# Patient Record
Sex: FEMALE | Race: BLACK OR AFRICAN AMERICAN
[De-identification: names, ages, dates, MRNs, and addresses within clinical notes are randomized per-mention and may not be internally consistent; named-entity substitution may affect disease eponyms.]

---

## 2018-02-23 ENCOUNTER — HOSPITAL ENCOUNTER (EMERGENCY)
Dept: HOSPITAL 63 - ER | Age: 58
Discharge: HOME | End: 2018-02-23
Payer: MEDICARE

## 2018-02-23 VITALS — HEIGHT: 67 IN | WEIGHT: 197 LBS | BODY MASS INDEX: 30.92 KG/M2

## 2018-02-23 VITALS
DIASTOLIC BLOOD PRESSURE: 67 MMHG | SYSTOLIC BLOOD PRESSURE: 140 MMHG | SYSTOLIC BLOOD PRESSURE: 140 MMHG | DIASTOLIC BLOOD PRESSURE: 67 MMHG

## 2018-02-23 DIAGNOSIS — J45.909: ICD-10-CM
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
## 2018-02-23 DIAGNOSIS — I25.10: ICD-10-CM
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
## 2018-02-23 DIAGNOSIS — S59.902A: Primary | ICD-10-CM

## 2018-02-23 DIAGNOSIS — Z88.1: ICD-10-CM
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
## 2018-02-23 DIAGNOSIS — Z88.2: ICD-10-CM
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
## 2018-02-23 DIAGNOSIS — E11.9: ICD-10-CM

## 2018-02-23 DIAGNOSIS — S39.012A: ICD-10-CM

## 2018-02-23 DIAGNOSIS — I10: ICD-10-CM

## 2018-02-23 DIAGNOSIS — Z88.8: ICD-10-CM

## 2018-02-23 DIAGNOSIS — S29.012A: ICD-10-CM

## 2018-02-23 PROCEDURE — 72072 X-RAY EXAM THORAC SPINE 3VWS: CPT

## 2018-02-23 PROCEDURE — 99284 EMERGENCY DEPT VISIT MOD MDM: CPT

## 2018-02-23 PROCEDURE — 73080 X-RAY EXAM OF ELBOW: CPT

## 2018-02-23 PROCEDURE — 72100 X-RAY EXAM L-S SPINE 2/3 VWS: CPT

## 2018-02-23 NOTE — RAD
Portable left elbow, 3 views, 2/23/2018:



History: Fall, left elbow pain



No fracture or dislocation is identified.  There is no radiographic evidence

of an elbow joint effusion.



IMPRESSION: No significant left elbow abnormality is detected..





Thoracic spine, 3 views, 2/23/2018:



History: Fall, back pain



The thoracic vertebral heights are well-maintained.  There is mild scattered

marginal spurs.  There is a slight left convexity upper thoracic scoliosis. 

No fracture or dislocation is identified.  A surgical plate and screws is

evident in the lower cervical spine.



IMPRESSION:

1.  Mild degenerative change.

2.  No acute bony abnormality is detected.







Portable lumbar spine, 3 views, 2/23/2018:



History: Fall, back pain



The lumbar vertebral heights and disc spaces are well-maintained.  There are

mild scattered marginal spurs.  There are moderate degenerative changes

involving scattered facet joints bilaterally.  No fracture is identified. 

There is a slight anterolisthesis at L4-5 apparently due to facet joint

arthropathy.  Minimal aortic calcific plaquing is noted.



IMPRESSION:

1.  Mild to moderate scattered degenerative changes.

2.  Slight anterolisthesis at L4-5 due to facet joint arthropathy.

3.  No acute bony abnormality is detected.

## 2018-02-23 NOTE — PHYS DOC
Past History


Past Medical History:  Asthma, CAD, Diabetes, Hypertension


Smoking:  Non-smoker





Adult General


Chief Complaint


Chief Complaint:  MECHANICAL FALL





HPI


HPI


57 year old female patient states she was getting out of her car and fell on 

ice on the driveway of her home and landed on her left site without head injury 

or loss of consciousness. Patient states she was not able to get up and one of 

the neighbor called 911 and they helped her to get up and she was able to walk 

but complaining of pain in left elbow and thoracic and lumbar spine and rated 

her pain 7/10 in elbow and 8/10 in her back a constant pain that getting force 

with movement. Patient denies focal neuro deficit, nausea and vomiting, 

headache. Patient is up-to-date with tetanus immunization.





Review of Systems


Review of Systems





Constitutional: Denies fever or chills []


Eyes: Denies change in visual acuity, redness, or eye pain []


HENT: Denies nasal congestion or sore throat []


Respiratory: Denies cough or shortness of breath []


Cardiovascular: No additional information not addressed in HPI []


GI: Denies abdominal pain, nausea, vomiting, bloody stools or diarrhea []


: Denies dysuria or hematuria []


Musculoskeletal: Reports back pain and joint pain


Integument: Denies rash or skin lesions []


Neurologic: Denies headache, focal weakness or sensory changes []


Endocrine: Denies polyuria or polydipsia []





All other systems were reviewed and found to be within normal limits, except as 

documented in this note.





Current Medications


Current Medications





Current Medications








 Medications


  (Trade)  Dose


 Ordered  Sig/Bryan  Start Time


 Stop Time Status Last Admin


Dose Admin


 


 Acetaminophen/


 Hydrocodone Bitart


  (Lortab 5/325)  1 tab  1X  ONCE  18 12:15


 18 12:16 UNV  


 











Allergies


Allergies





Allergies








Coded Allergies Type Severity Reaction Last Updated Verified


 


  Sulfa (Sulfonamide Antibiotics) Allergy Unknown  18 Yes


 


  nitrofurantoin Allergy Unknown  18 Yes


 


  sulfamethoxazole Allergy Unknown  18 Yes


 


  trimethoprim Allergy Unknown  18 Yes











Physical Exam


Physical Exam





Constitutional: Well developed, well nourished, mild distress, non-toxic 

appearance. []


HENT: Normocephalic, atraumatic, bilateral external ears normal, oropharynx 

moist, no oral exudates, nose normal. []


Eyes: PERRLA, EOMI, conjunctiva normal, no discharge. [] 


Neck: Normal range of motion, no tenderness, supple, no stridor. [] 


Cardiovascular:Heart rate regular rhythm, no murmur []


Lungs & Thorax:  Bilateral breath sounds clear to auscultation []


Abdomen: Bowel sounds normal, soft, no tenderness, no masses, no pulsatile 

masses. [] 


Skin: Warm, dry, no erythema, no rash. [] 


Back: No deformity or midline tenderness, limited range of motion of thoracic 

and lumbar spine because of pain


Extremities: No deformity of left elbow and forearm, mild tenderness in 

proximal posterior side of left forearm, no neurovascular deficit


Neurologic: Alert and oriented X 3, normal motor function, normal sensory 

function, no focal deficits noted. []


Psychologic: Affect normal, judgement normal, mood normal. []





EKG


EKG


[]





Radiology/Procedures


Radiology/Procedures


[] [facility] [address], [place] [address]


 [phone]


 


 IMAGING REPORT





 Signed





PATIENT: LUCA RENNER ACCOUNT: [id number] MRN#: [id number]


: 1960 LOCATION: ER AGE: 57


SEX: F EXAM DT: 18 ACCESSION#: [id number]; [id number]; [id number]


STATUS: REG ER ORD. PHYSICIAN: SONJA MATHIAS MD 


REASON: fall


PROCEDURE: ELBOW LEFT 3V; LUMBAR SPINE 2-3V; THORACIC SPINE 3V





Portable left elbow, 3 views, 2018:





History: Fall, left elbow pain





No fracture or dislocation is identified.  There is no radiographic evidence


of an elbow joint effusion.





IMPRESSION: No significant left elbow abnormality is detected..








Thoracic spine, 3 views, 2018:





History: Fall, back pain





The thoracic vertebral heights are well-maintained.  There is mild scattered


marginal spurs.  There is a slight left convexity upper thoracic scoliosis. 


No fracture or dislocation is identified.  A surgical plate and screws is


evident in the lower cervical spine.





IMPRESSION:


1.  Mild degenerative change.


2.  No acute bony abnormality is detected.











Portable lumbar spine, 3 views, 2018:





History: Fall, back pain





The lumbar vertebral heights and disc spaces are well-maintained.  There are


mild scattered marginal spurs.  There are moderate degenerative changes


involving scattered facet joints bilaterally.  No fracture is identified. 


There is a slight anterolisthesis at L4-5 apparently due to facet joint


arthropathy.  Minimal aortic calcific plaquing is noted.





IMPRESSION:


1.  Mild to moderate scattered degenerative changes.


2.  Slight anterolisthesis at L4-5 due to facet joint arthropathy.


3.  No acute bony abnormality is detected.





Course & Med Decision Making


Course & Med Decision Making


Pertinent Imaging studies reviewed. (See chart for details)


Evaluation of patient in ER showed 57-year-old female patient with a fall and 

injury to left elbow and back. Patient had unremarkable physical exam except 

for mild limited range of motion because of pain. X-ray of left elbow and 

thoracic and lumbar spine was unremarkable. Patient treated with Norco and felt 

better. Plan discharge patient home with diagnosis of mechanical fall and 

injury to elbow. Patient completed without problem.





I've spoken with the patient and/or caregivers. I've explained the patient's 

condition, diagnosis and treatment plan based on information available to me at 

this time. I've answered the patient's and/or caregivers questions and 

addressed any concerns. The patient and/or caregivers have a good understanding 

the patient's diagnosis, condition and treatment plan as can be expected at 

this point. Vital signs have been stabilized. The patient's condition is stable 

for discharge from the emergency department.





The patient will pursue further outpatient evaluation with her primary care 

provider or other designated consulting physician as outlined in the discharge 

instructions. Patient and/or caregivers are agreeable to this plan of care and 

follow-up instructions have been explained in detail. The patient and/or 

caregivers have received these instructions in written format and expressed 

understanding of these discharge instructions. The patient and her caregivers 

are aware that if any significant change in condition or worsening of symptoms 

should prompt him to immediately return to this of the closest emergency 

department.  If an emergent department is not readily available I would 

encourage him to call 911.


[]





Dragon Disclaimer


Dragon Disclaimer


This electronic medical record was generated, in whole or in part, using a 

voice recognition dictation system.





Departure


Departure:


Impression:  


 Primary Impression:  


 Injury of left elbow


 Additional Impressions:  


 Thoracic myofascial strain


 Lumbar strain


 Fall from slipping on ice


Disposition:   HOME, SELF-CARE (At 1308)


Condition:  IMPROVED


Referrals:  


KRISTINA MARTE MD (PCP)


Patient Instructions:  Contusion, Lumbosacral Strain, Thoracic Strain





Additional Instructions:  


Apply ice on the affected area


Follow-up with your primary care physician in 3-5 days


Return to ER if not getting better


Scripts


Hydrocodone Bit/Acetaminophen (NORCO 5-325 TABLET) 1 Each Tablet


1 TAB PO PRN Q6HRS Y for PAIN, #14 TAB 0 Refills


   Prov: SONJA MATHIAS MD         18





Problem Qualifiers











SONJA MATHIAS MD 2018 12:10

## 2018-03-08 ENCOUNTER — HOSPITAL ENCOUNTER (OUTPATIENT)
Dept: HOSPITAL 63 - SURG | Age: 58
Discharge: HOME | End: 2018-03-08
Attending: ANESTHESIOLOGY
Payer: MEDICARE

## 2018-03-08 DIAGNOSIS — G47.33: ICD-10-CM

## 2018-03-08 DIAGNOSIS — E11.9: ICD-10-CM

## 2018-03-08 DIAGNOSIS — Z79.4: ICD-10-CM

## 2018-03-08 DIAGNOSIS — J45.909: ICD-10-CM

## 2018-03-08 DIAGNOSIS — Z98.890: ICD-10-CM

## 2018-03-08 DIAGNOSIS — I10: ICD-10-CM

## 2018-03-08 DIAGNOSIS — Z79.899: ICD-10-CM

## 2018-03-08 DIAGNOSIS — Z88.8: ICD-10-CM

## 2018-03-08 DIAGNOSIS — M47.816: Primary | ICD-10-CM

## 2018-03-08 DIAGNOSIS — Z88.2: ICD-10-CM

## 2018-03-08 PROCEDURE — 82947 ASSAY GLUCOSE BLOOD QUANT: CPT

## 2018-03-08 PROCEDURE — 99152 MOD SED SAME PHYS/QHP 5/>YRS: CPT

## 2018-03-08 PROCEDURE — 64493 INJ PARAVERT F JNT L/S 1 LEV: CPT

## 2018-03-08 PROCEDURE — 64494 INJ PARAVERT F JNT L/S 2 LEV: CPT

## 2018-04-05 ENCOUNTER — HOSPITAL ENCOUNTER (INPATIENT)
Dept: HOSPITAL 63 - ER | Age: 58
LOS: 3 days | Discharge: ANOTHER HOSPITAL | DRG: 546 | End: 2018-04-08
Attending: INTERNAL MEDICINE | Admitting: INTERNAL MEDICINE
Payer: MEDICARE

## 2018-04-05 VITALS — DIASTOLIC BLOOD PRESSURE: 81 MMHG | SYSTOLIC BLOOD PRESSURE: 138 MMHG

## 2018-04-05 VITALS — BODY MASS INDEX: 32.18 KG/M2 | HEIGHT: 67 IN | WEIGHT: 205 LBS

## 2018-04-05 VITALS — DIASTOLIC BLOOD PRESSURE: 82 MMHG | SYSTOLIC BLOOD PRESSURE: 141 MMHG

## 2018-04-05 VITALS — DIASTOLIC BLOOD PRESSURE: 76 MMHG | SYSTOLIC BLOOD PRESSURE: 156 MMHG

## 2018-04-05 VITALS — DIASTOLIC BLOOD PRESSURE: 75 MMHG | SYSTOLIC BLOOD PRESSURE: 142 MMHG

## 2018-04-05 DIAGNOSIS — I25.10: ICD-10-CM

## 2018-04-05 DIAGNOSIS — M31.0: Primary | ICD-10-CM

## 2018-04-05 DIAGNOSIS — Z88.8: ICD-10-CM

## 2018-04-05 DIAGNOSIS — N17.9: ICD-10-CM

## 2018-04-05 DIAGNOSIS — Z88.2: ICD-10-CM

## 2018-04-05 DIAGNOSIS — E78.5: ICD-10-CM

## 2018-04-05 DIAGNOSIS — Z82.49: ICD-10-CM

## 2018-04-05 DIAGNOSIS — J45.901: ICD-10-CM

## 2018-04-05 DIAGNOSIS — Z82.3: ICD-10-CM

## 2018-04-05 DIAGNOSIS — Z88.1: ICD-10-CM

## 2018-04-05 DIAGNOSIS — I10: ICD-10-CM

## 2018-04-05 DIAGNOSIS — Z98.1: ICD-10-CM

## 2018-04-05 DIAGNOSIS — Z83.3: ICD-10-CM

## 2018-04-05 DIAGNOSIS — M10.9: ICD-10-CM

## 2018-04-05 DIAGNOSIS — K22.5: ICD-10-CM

## 2018-04-05 DIAGNOSIS — E11.65: ICD-10-CM

## 2018-04-05 LAB
ALBUMIN SERPL-MCNC: 3.1 G/DL (ref 3.4–5)
ALBUMIN/GLOB SERPL: 0.8 {RATIO} (ref 1–1.7)
ALP SERPL-CCNC: 108 U/L (ref 46–116)
ALT SERPL-CCNC: 34 U/L (ref 14–59)
ANION GAP SERPL CALC-SCNC: 11 MMOL/L (ref 6–14)
AST SERPL-CCNC: 22 U/L (ref 15–37)
BASOPHILS # BLD AUTO: 0 X10^3/UL (ref 0–0.2)
BASOPHILS NFR BLD: 1 % (ref 0–3)
BILIRUB SERPL-MCNC: 0.2 MG/DL (ref 0.2–1)
BUN/CREAT SERPL: 19 (ref 6–20)
CA-I SERPL ISE-MCNC: 21 MG/DL (ref 7–20)
CALCIUM SERPL-MCNC: 9.3 MG/DL (ref 8.5–10.1)
CHLORIDE SERPL-SCNC: 105 MMOL/L (ref 98–107)
CO2 SERPL-SCNC: 28 MMOL/L (ref 21–32)
CREAT SERPL-MCNC: 1.1 MG/DL (ref 0.6–1)
EOSINOPHIL NFR BLD: 0.4 X10^3/UL (ref 0–0.7)
EOSINOPHIL NFR BLD: 6 % (ref 0–3)
ERYTHROCYTE [DISTWIDTH] IN BLOOD BY AUTOMATED COUNT: 15.1 % (ref 11.5–14.5)
GFR SERPLBLD BASED ON 1.73 SQ M-ARVRAT: 62.2 ML/MIN
GLOBULIN SER-MCNC: 4 G/DL (ref 2.2–3.8)
GLUCOSE SERPL-MCNC: 201 MG/DL (ref 70–99)
HCT VFR BLD CALC: 37.5 % (ref 36–47)
HGB BLD-MCNC: 12.3 G/DL (ref 12–15.5)
LYMPHOCYTES # BLD: 2.1 X10^3/UL (ref 1–4.8)
LYMPHOCYTES NFR BLD AUTO: 36 % (ref 24–48)
MAGNESIUM SERPL-MCNC: 1.7 MG/DL (ref 1.8–2.4)
MCH RBC QN AUTO: 31 PG (ref 25–35)
MCHC RBC AUTO-ENTMCNC: 33 G/DL (ref 31–37)
MCV RBC AUTO: 94 FL (ref 79–100)
MONO #: 0.7 X10^3/UL (ref 0–1.1)
MONOCYTES NFR BLD: 11 % (ref 0–9)
NEUT #: 2.7 X10^3UL (ref 1.8–7.7)
NEUTROPHILS NFR BLD AUTO: 46 % (ref 31–73)
PLATELET # BLD AUTO: 239 X10^3/UL (ref 140–400)
POTASSIUM SERPL-SCNC: 3.8 MMOL/L (ref 3.5–5.1)
PROT SERPL-MCNC: 7.1 G/DL (ref 6.4–8.2)
RBC # BLD AUTO: 4.01 X10^6/UL (ref 3.5–5.4)
SODIUM SERPL-SCNC: 144 MMOL/L (ref 136–145)
WBC # BLD AUTO: 5.8 X10^3/UL (ref 4–11)

## 2018-04-05 PROCEDURE — 85007 BL SMEAR W/DIFF WBC COUNT: CPT

## 2018-04-05 PROCEDURE — 93005 ELECTROCARDIOGRAM TRACING: CPT

## 2018-04-05 PROCEDURE — 96360 HYDRATION IV INFUSION INIT: CPT

## 2018-04-05 PROCEDURE — 96374 THER/PROPH/DIAG INJ IV PUSH: CPT

## 2018-04-05 PROCEDURE — 36415 COLL VENOUS BLD VENIPUNCTURE: CPT

## 2018-04-05 PROCEDURE — 96361 HYDRATE IV INFUSION ADD-ON: CPT

## 2018-04-05 PROCEDURE — 80053 COMPREHEN METABOLIC PANEL: CPT

## 2018-04-05 PROCEDURE — 80048 BASIC METABOLIC PNL TOTAL CA: CPT

## 2018-04-05 PROCEDURE — 82947 ASSAY GLUCOSE BLOOD QUANT: CPT

## 2018-04-05 PROCEDURE — 85025 COMPLETE CBC W/AUTO DIFF WBC: CPT

## 2018-04-05 PROCEDURE — 83735 ASSAY OF MAGNESIUM: CPT

## 2018-04-05 PROCEDURE — 71045 X-RAY EXAM CHEST 1 VIEW: CPT

## 2018-04-05 PROCEDURE — 82553 CREATINE MB FRACTION: CPT

## 2018-04-05 PROCEDURE — 94640 AIRWAY INHALATION TREATMENT: CPT

## 2018-04-05 PROCEDURE — 84484 ASSAY OF TROPONIN QUANT: CPT

## 2018-04-05 PROCEDURE — 71046 X-RAY EXAM CHEST 2 VIEWS: CPT

## 2018-04-05 RX ADMIN — ALBUTEROL SULFATE SCH MG: 108 AEROSOL, METERED RESPIRATORY (INHALATION) at 18:00

## 2018-04-05 RX ADMIN — METHYLPREDNISOLONE SODIUM SUCCINATE SCH MG: 125 INJECTION, POWDER, FOR SOLUTION INTRAMUSCULAR; INTRAVENOUS at 13:05

## 2018-04-05 RX ADMIN — ALLOPURINOL SCH MG: 300 TABLET ORAL at 13:10

## 2018-04-05 RX ADMIN — INSULIN ASPART SCH UNITS: 100 INJECTION, SOLUTION INTRAVENOUS; SUBCUTANEOUS at 17:00

## 2018-04-05 RX ADMIN — IPRATROPIUM BROMIDE AND ALBUTEROL SULFATE SCH ML: .5; 3 SOLUTION RESPIRATORY (INHALATION) at 20:51

## 2018-04-05 RX ADMIN — IPRATROPIUM BROMIDE AND ALBUTEROL SULFATE SCH ML: .5; 3 SOLUTION RESPIRATORY (INHALATION) at 11:44

## 2018-04-05 RX ADMIN — LOSARTAN POTASSIUM SCH MG: 50 TABLET, FILM COATED ORAL at 13:07

## 2018-04-05 RX ADMIN — METHYLPREDNISOLONE SODIUM SUCCINATE SCH MG: 125 INJECTION, POWDER, FOR SOLUTION INTRAMUSCULAR; INTRAVENOUS at 18:04

## 2018-04-05 RX ADMIN — GABAPENTIN SCH MG: 300 CAPSULE ORAL at 13:09

## 2018-04-05 RX ADMIN — ATORVASTATIN CALCIUM SCH MG: 20 TABLET, FILM COATED ORAL at 13:00

## 2018-04-05 RX ADMIN — IPRATROPIUM BROMIDE AND ALBUTEROL SULFATE SCH ML: .5; 3 SOLUTION RESPIRATORY (INHALATION) at 16:00

## 2018-04-05 RX ADMIN — METHYLPREDNISOLONE SODIUM SUCCINATE SCH MG: 125 INJECTION, POWDER, FOR SOLUTION INTRAMUSCULAR; INTRAVENOUS at 23:35

## 2018-04-05 RX ADMIN — MONTELUKAST SODIUM SCH MG: 10 TABLET ORAL at 21:08

## 2018-04-05 RX ADMIN — ALBUTEROL SULFATE SCH MG: 108 AEROSOL, METERED RESPIRATORY (INHALATION) at 16:30

## 2018-04-05 RX ADMIN — INSULIN ASPART SCH UNITS: 100 INJECTION, SOLUTION INTRAVENOUS; SUBCUTANEOUS at 17:55

## 2018-04-05 RX ADMIN — GABAPENTIN SCH MG: 300 CAPSULE ORAL at 21:09

## 2018-04-05 RX ADMIN — INSULIN ASPART SCH UNITS: 100 INJECTION, SOLUTION INTRAVENOUS; SUBCUTANEOUS at 16:30

## 2018-04-05 RX ADMIN — ASPIRIN 81 MG SCH MG: 81 TABLET ORAL at 13:06

## 2018-04-05 RX ADMIN — TRIAMTERENE AND HYDROCHLOROTHIAZIDE SCH TAB: 37.5; 25 TABLET ORAL at 13:17

## 2018-04-05 RX ADMIN — INSULIN DETEMIR SCH UNITS: 100 INJECTION, SOLUTION SUBCUTANEOUS at 21:17

## 2018-04-05 RX ADMIN — BUDESONIDE SCH MG: 0.5 SUSPENSION RESPIRATORY (INHALATION) at 20:51

## 2018-04-05 NOTE — PHYS DOC
Past History


Past Medical History:  Asthma, CAD, Diabetes, Hypertension


Past Surgical History:  Other


Smoking:  Non-smoker


Alcohol Use:  None


Drug Use:  None





Adult General


Chief Complaint


Chief Complaint:  SHORTNESS OF BREATH





HPI


HPI





Patient is a 57 year old female who presents with complaint shortness of 

breath. The patient states her symptoms started approximately 4-5 days ago. The 

patient states that she has been having coughing and wheezing. The patient went 

to see her primary care provider and was treated with amoxicillin, Tessalon 

Perles, and cough medication. Patient states that she does have history of 

chronic asthma. Patient denies any associated fevers. Patient states despite 

taking the medications her condition has been worsening over the past 1-2 days. 

Patient states she is very short of breath with minimal exertion and has been 

having quite a bit of wheezing. Patient denies chest pain, vomiting, or 

diarrhea.





Review of Systems


Review of Systems





Constitutional: Denies fever or chills []


Eyes: Denies change in visual acuity, redness, or eye pain []


HENT: Denies nasal congestion or sore throat []


Respiratory: Cough, wheezing, shortness of breath[]


Cardiovascular: Denies chest pain or edema[]


GI: Denies abdominal pain, nausea, vomiting, bloody stools or diarrhea []


: Denies dysuria or hematuria []


Musculoskeletal: Denies back pain or joint pain []


Integument: Denies rash or skin lesions []


Neurologic: Denies headache, focal weakness or sensory changes []








All other systems were reviewed and found to be within normal limits, except as 

documented in this note.





Current Medications


Current Medications





Current Medications








 Medications


  (Trade)  Dose


 Ordered  Sig/Bryan  Start Time


 Stop Time Status Last Admin


Dose Admin


 


 Albuterol Sulfate


  (Ventolin)  5 mg  1X  ONCE  18 08:30


 18 08:31   


 


 


 Albuterol/


 Ipratropium


  (Duoneb)  3 ml  STK-MED ONCE  18 07:45


 18 07:46 DC  


 


 


 Magnesium Sulfate  100 ml @ 


 100 mls/hr  1X  ONCE  18 08:00


 18 08:59  18 08:22


100 MLS/HR


 


 Methylprednisolone


 Sodium Succinate


  (SOLU-Medrol


 125MG VIAL)  125 mg  1X  ONCE  18 08:30


 18 08:31  18 08:22


125 MG


 


 Sodium Chloride  1,000 ml @ 


 1,000 mls/hr  Q1H  18 08:00


 18 08:59  18 08:22


1,000 MLS/HR











Allergies


Allergies





Allergies








Coded Allergies Type Severity Reaction Last Updated Verified


 


  Sulfa (Sulfonamide Antibiotics) Allergy Unknown  18 Yes


 


  nitrofurantoin Allergy Unknown  18 Yes


 


  sulfamethoxazole Allergy Unknown  18 Yes


 


  trimethoprim Allergy Unknown  18 Yes











Physical Exam


Physical Exam





Constitutional: Alert, afebrile, appears in mild-to-moderate respiratory 

distress. []


HENT: Normocephalic, atraumatic, bilateral external ears normal, oropharynx 

moist, no oral exudates, nose normal. []


Eyes: PERRLA, EOMI, conjunctiva normal, no discharge. [] 


Neck: Normal range of motion, no tenderness, supple, no stridor. [] 


Cardiovascular:Heart rate regular rhythm, no murmur []


Lungs & Thorax: Moderately restricted air movement bilaterally, prolonged 

expiratory phase, expiratory wheezes bilaterally, no rales[]


Abdomen: Bowel sounds normal, soft, no tenderness, no masses, no pulsatile 

masses. [] 


Skin: Warm, dry, no erythema, no rash. [] 


Back: No tenderness, no CVA tenderness. [] 


Extremities: No tenderness, no cyanosis, no clubbing, ROM intact, no edema. [] 


Neurologic: Alert and oriented X 3, normal motor function, normal sensory 

function, no focal deficits noted. []





Current Patient Data


Vital Signs





 Vital Signs








  Date Time  Temp Pulse Resp B/P (MAP) Pulse Ox O2 Delivery O2 Flow Rate FiO2


 


18 07:50     98 Room Air  


 


18 07:49 97.8 86 22     








Lab Results





 Laboratory Tests








Test


  18


08:14


 


White Blood Count


  5.8 x10^3/uL


(4.0-11.0)


 


Red Blood Count


  4.01 x10^6/uL


(3.50-5.40)


 


Hemoglobin


  12.3 g/dL


(12.0-15.5)


 


Hematocrit


  37.5 %


(36.0-47.0)


 


Mean Corpuscular Volume


  94 fL ()


 


 


Mean Corpuscular Hemoglobin 31 pg (25-35)  


 


Mean Corpuscular Hemoglobin


Concent 33 g/dL


(31-37)


 


Red Cell Distribution Width


  15.1 %


(11.5-14.5)  H


 


Platelet Count


  239 x10^3/uL


(140-400)


 


Neutrophils (%) (Auto) 46 % (31-73)  


 


Lymphocytes (%) (Auto) 36 % (24-48)  


 


Monocytes (%) (Auto) 11 % (0-9)  H


 


Eosinophils (%) (Auto) 6 % (0-3)  H


 


Basophils (%) (Auto) 1 % (0-3)  


 


Neutrophils # (Auto)


  2.7 x10^3uL


(1.8-7.7)


 


Lymphocytes # (Auto)


  2.1 x10^3/uL


(1.0-4.8)


 


Monocytes # (Auto)


  0.7 x10^3/uL


(0.0-1.1)


 


Eosinophils # (Auto)


  0.4 x10^3/uL


(0.0-0.7)


 


Basophils # (Auto)


  0.0 x10^3/uL


(0.0-0.2)











EKG


EKG


Interpreted by me: Heart rate 92, sinus rhythm, leftward axis, no acute ST/T-

wave abnormalities present[]





Radiology/Procedures


Radiology/Procedures


 [facility] [address], [place] [address]


 [phone]


 


 IMAGING REPORT





 Signed





PATIENT: LUCA RENNER ACCOUNT: [id number] MRN#: [id number]


: 1960 LOCATION: ER AGE: 57


SEX: F EXAM DT: 18 ACCESSION#: [id number]


STATUS: REG ER ORD. PHYSICIAN: FARTUN BATRES MD 


REASON: cough, shortness of breath


PROCEDURE: PORTABLE CHEST 1V





PORTABLE CHEST 1V


 


Clinical Indication: cough shortness of air  


 


Comparison:  Thoracic spine radiographs 2018.


 


Findings: 


The cardiomediastinal silhouette is normal. Lungs are clear. There is no 


pneumothorax. No pleural effusion is appreciated. No acute bone 


abnormality. Right humeral head suture anchors. Cervical spine fusion 


hardware partially seen.


 


IMPRESSION: 


No acute cardiopulmonary process.


 


 


Electronically signed by: Ede Duran MD (2018 8:51 AM) [id number]














DICTATED AND SIGNED BY:     EDE DURAN MD


DATE:     18 0847





CC: FARTUN BATRES MD; KRISTINA MARTE MD ~


[]





Course & Med Decision Making


Course & Med Decision Making


Pertinent Labs and Imaging studies reviewed. (See chart for details)





Patient was given one unit doses of DuoNeb and 2 unit doses of albuterol as 

well as IV Solu-Medrol in the emergency department. On reevaluation, patient 

states that her symptoms slightly improved, however patient continues to have 

labored respirations. Given chronicity of symptoms as well as failure of 

outpatient treatment, the patient was offered admission to the hospital for 

further treatment which she accepted. I spoke with Dr. Lopez who accepted care 

patient in hospital.





Dragon Disclaimer


Dragon Disclaimer


This electronic medical record was generated, in whole or in part, using a 

voice recognition dictation system.





Departure


Departure:


Impression:  


 Primary Impression:  


 Asthma exacerbation


 Additional Impression:  


 Hyperglycemia


Disposition:  09 ADMITTED AS INPATIENT


Admitting Physician:  Sandra Lopez


Condition:  STABLE


Referrals:  


KRISTINA MARTE MD (PCP)





Problem Qualifiers








 Primary Impression:  


 Asthma exacerbation


 Asthma severity:  moderate  Asthma persistence:  persistent  Qualified Codes:  

J45.41 - Moderate persistent asthma with (acute) exacerbation








FARTUN BATRES MD 2018 08:34

## 2018-04-05 NOTE — HP
ADMIT DATE:  2018



HISTORY OF PRESENT ILLNESS:  The patient is a 57-year-old -American

female patient who came to the Emergency Room, complaining of shortness of

breath, chest tightness.  Her complaint started about 4 or 5 days ago.  She

stated that she has been having coughing and sneezing.  She went to her primary

care physician and was treated with amoxicillin, Tessalon Perles and cough

medication without much improvement.  She does have history of chronic asthma

for which she has 2 inhalers that she has been occasionally using them and has

not had any acute asthma exacerbation for a long time.  The patient denied any

fever; however, her despite taking all these medication, her condition has

worsened over the last 1-2 days and was seen in the Emergency Room and was

admitted for acute asthma exacerbation.  In the Emergency Room, she was

investigated extensively.  Her lab work showed a normal white cell count of

5800.  Her chest x-ray showed no pneumothorax, no pleural effusion, no acute

bony abnormality.  The cardiomediastinal silhouette is normal and lungs are

clear.  She was admitted to continue treatment with Solu-Medrol, nebulized

treatment.  



PAST MEDICAL HISTORY:  Significant for bronchial asthma, although she stated

that has been mild and she used inhalers only occasionally.  She has never

required mechanical ventilation.  She is known to have type 2 diabetes,

hypertension, hyperlipidemia.  She claims that she has coronary artery disease

and has 2 myocardial infarction.  She has history of gout, Zenker's

diverticulum.



PAST SURGICAL HISTORY:  Significant for cervical spine fusion, bilateral rotator

cuff tear repair, right knee arthroscopic surgery, plantar fasciitis, sinus

surgery.  She has also had an esophagogastroduodenoscopy and colonoscopy.



ALLERGIES:  SHE IS ALLERGIC TO SULFA, NITROFURANTOIN, SULFAMETHOXAZOLE,

TRIMETHOPRIM.



MEDICATIONS:  She is currently on following medications:  She is on loratadine

10 mg daily p.r.n., albuterol sulfate 1 puff every 4 hours, ____ Serevent Diskus

50 mcg inhalation twice a day, Crestor 40 mg daily, amlodipine besylate 5 mg

once a day, losartan potassium 100 mg daily, aspirin 81 mg once a day,

diclofenac sodium 100 grams gel applied topically 4 times a day for joint pain,

gabapentin 600 mg twice a day, triamterene/hydrochlorothiazide 37.5/25 one

tablet daily.  She is on Colace 100 mg twice a day, polyethylene glycol 17 grams

daily, ranitidine 150 mg twice a day.  She is on NovoLog 40 units before meals

and NovoLog 45 units before supper, Lantus 60 units twice a day.  She is on

glipizide 10 mg 3 times a day before meals and allopurinol 300 mg daily.



FAMILY HISTORY:  She has 1 younger brother who has diabetes.  Her father  at

age of 70 because of myocardial infarction and CVA.  He is known to have

diabetes and hypertension.  Her mother is still alive at age of 76 and has

diabetes and hypertension.



SOCIAL HISTORY:  She is , has 3 daughters and 2 sons.  She never smoked. 

She drinks alcohol occasionally.  Does not use any illicit drugs.  She is a

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



REVIEW OF SYSTEMS:  The patient denied any blurring of vision, cataract,

glaucoma or macular degeneration.  Denied any earache, tinnitus or sensorineural

deafness.  Denied any nosebleeds, stuffy nose, but did complain of stuffy nose. 

Denied any postnasal drip.  Denied any sore throat, sore tongue, toothache,

hoarseness of voice or difficulty swallowing.  She did have what seems to be

Zenker's diverticulum.  Denied any nausea, vomiting, although occasionally

vomits because of the severe cough bouts.  No diarrhea or constipation.  No

hematemesis, melena or hematochezia.  Denied any dysuria, frequency, hematuria. 

Denied any chest pain.  Did complain of shortness of breath, chest tightness and

wheezing and had cough, mostly dry with scanty whitish sputum.  Denied any

chills, rigors, or fever.  Denied any dizziness, lightheadedness, or vertigo.



PHYSICAL EXAMINATION:  

VITAL SIGNS:  On arrival to the Emergency Room, her heart rate was 84, blood

pressure 148/90, temperature was 97.8, respiratory rate 22 and oxygen saturation

was 95% on room air.

HEENT:  Showed normocephalic, atraumatic.

NECK:  Supple.

HEART:  Showed normal first and second heart sounds with no gallop, rub or

murmur.

CHEST:  Showed central trachea, equal bilateral expansion, air entry, vesicular

sounds with very scattered rhonchi, more prominent posteriorly.  I could not

appreciate any crepitation.

ABDOMEN:  Distended, soft, nontender.  No guarding or rigidity.  No

organomegaly.  All hernial orifices intact.  Bowel sounds normal.

NEUROLOGIC:  She was awake, alert, responding appropriately.  All cranial nerves

intact.

EXTREMITIES:  She moves extremities without difficulty.  She ambulates without

assistance or assistive devices.



LABORATORY DATA:  On arrival showed a white cell count of 5800, hemoglobin 12.3,

hematocrit 37.5, MCV 94 and platelet count 239,000.  Her serum sodium was 144,

potassium 3.8, chloride 105, bicarbonate 28, anion gap of 11, BUN 21, creatinine

1.1, estimated GFR was 62 mL per minute.  Her glucose was 201, calcium was 9.3,

magnesium was 1.7.  Total bilirubin, AST, ALT, alkaline phosphatase were normal.

 Her total protein was 7.1, albumin was 3.1.  Her chest x-ray showed that the

patient had the cardiomediastinal silhouette, is normal.

Lungs are clear.  There is no pneumothorax, no pleural effusion is appreciated. 

No acute bone abnormality.  She has right humeral head suture anchors, cervical

spine fusion hardware partially seen.



IMPRESSION:  In summary, this is a 57-year-old -American female patient

who presented with acute severe asthma.  She has multiple other medical problems

including type 2 diabetes that seems to be very poorly controlled, hypertension,

hyperlipidemia, gout, and Zenker's diverticulum.



PLAN:  My plan is to continue with IV Solu-Medrol, bronchodilator and I will add

Singulair and Pulmicort.  Her blood sugar obviously is going to be extremely

high with the steroids.  We will adjust her insulin as needed.  I will repeat

all her lab works tomorrow and obviously will taper her steroids once she

started turning the corner.  She does not seem to be in really severe asthma

because she was able to lie flat and is not using her accessory muscles, or she

was not in a tripod position.





______________________________

DWAINE CAMPBELL MD



DR:  JORDAN/rita  JOB#:  [id number] / [id number]

DD:  2018 17:24  DT:  2018 18:35

## 2018-04-05 NOTE — EKG
[facility] [address], [place] [address]

Test Date:    2018               Test Time:    08:18:35

Pat Name:     LUCA RENNER           Department:   

Patient ID:   [id number]           Room:          

Gender:       F                        Technician:   

:          1960               Requested By: FARTUN BATRES

Order Number: [id number]            Reading MD:   Paramjit Botello MD

                                 Measurements

Intervals                              Axis          

Rate:         92                       P:            62

MD:           154                      QRS:          -2

QRSD:         96                       T:            31

QT:           360                                    

QTc:          450                                    

                           Interpretive Statements

SINUS RHYTHM



Electronically Signed On 2018 16:32:17 CDT by Paramjit Botello MD

## 2018-04-05 NOTE — RAD
PORTABLE CHEST 1V

 

Clinical Indication: cough shortness of air  

 

Comparison:  Thoracic spine radiographs 2/23/2018.

 

Findings: 

The cardiomediastinal silhouette is normal. Lungs are clear. There is no 

pneumothorax. No pleural effusion is appreciated. No acute bone 

abnormality. Right humeral head suture anchors. Cervical spine fusion 

hardware partially seen.

 

IMPRESSION: 

No acute cardiopulmonary process.

 

 

Electronically signed by: Ede Duran MD (4/5/2018 8:51 AM) [id number]

## 2018-04-06 VITALS — SYSTOLIC BLOOD PRESSURE: 129 MMHG

## 2018-04-06 VITALS — DIASTOLIC BLOOD PRESSURE: 75 MMHG | SYSTOLIC BLOOD PRESSURE: 137 MMHG

## 2018-04-06 VITALS — SYSTOLIC BLOOD PRESSURE: 142 MMHG | DIASTOLIC BLOOD PRESSURE: 84 MMHG

## 2018-04-06 VITALS — SYSTOLIC BLOOD PRESSURE: 152 MMHG | DIASTOLIC BLOOD PRESSURE: 77 MMHG

## 2018-04-06 LAB
ANION GAP SERPL CALC-SCNC: 10 MMOL/L (ref 6–14)
BASOPHILS # BLD AUTO: 0 X10^3/UL (ref 0–0.2)
BASOPHILS NFR BLD: 0 % (ref 0–3)
CA-I SERPL ISE-MCNC: 28 MG/DL (ref 7–20)
CALCIUM SERPL-MCNC: 9.9 MG/DL (ref 8.5–10.1)
CHLORIDE SERPL-SCNC: 101 MMOL/L (ref 98–107)
CO2 SERPL-SCNC: 26 MMOL/L (ref 21–32)
CREAT SERPL-MCNC: 1.3 MG/DL (ref 0.6–1)
EOSINOPHIL NFR BLD: 0 % (ref 0–3)
EOSINOPHIL NFR BLD: 0 X10^3/UL (ref 0–0.7)
ERYTHROCYTE [DISTWIDTH] IN BLOOD BY AUTOMATED COUNT: 15.7 % (ref 11.5–14.5)
GFR SERPLBLD BASED ON 1.73 SQ M-ARVRAT: 51.3 ML/MIN
GLUCOSE SERPL-MCNC: 365 MG/DL (ref 70–99)
HCT VFR BLD CALC: 36.9 % (ref 36–47)
HGB BLD-MCNC: 12.1 G/DL (ref 12–15.5)
LYMPHOCYTES # BLD: 1.8 X10^3/UL (ref 1–4.8)
LYMPHOCYTES NFR BLD AUTO: 21 % (ref 24–48)
MCH RBC QN AUTO: 31 PG (ref 25–35)
MCHC RBC AUTO-ENTMCNC: 33 G/DL (ref 31–37)
MCV RBC AUTO: 95 FL (ref 79–100)
MONO #: 0.2 X10^3/UL (ref 0–1.1)
MONOCYTES NFR BLD: 2 % (ref 0–9)
NEUT #: 6.4 X10^3UL (ref 1.8–7.7)
NEUTROPHILS NFR BLD AUTO: 77 % (ref 31–73)
PLATELET # BLD AUTO: 263 X10^3/UL (ref 140–400)
POTASSIUM SERPL-SCNC: 4.5 MMOL/L (ref 3.5–5.1)
RBC # BLD AUTO: 3.9 X10^6/UL (ref 3.5–5.4)
SODIUM SERPL-SCNC: 137 MMOL/L (ref 136–145)
WBC # BLD AUTO: 8.4 X10^3/UL (ref 4–11)

## 2018-04-06 RX ADMIN — TRIAMTERENE AND HYDROCHLOROTHIAZIDE SCH TAB: 37.5; 25 TABLET ORAL at 08:02

## 2018-04-06 RX ADMIN — INSULIN DETEMIR SCH UNITS: 100 INJECTION, SOLUTION SUBCUTANEOUS at 21:07

## 2018-04-06 RX ADMIN — MONTELUKAST SODIUM SCH MG: 10 TABLET ORAL at 20:55

## 2018-04-06 RX ADMIN — ALBUTEROL SULFATE SCH MG: 108 AEROSOL, METERED RESPIRATORY (INHALATION) at 15:34

## 2018-04-06 RX ADMIN — METHYLPREDNISOLONE SODIUM SUCCINATE SCH MG: 125 INJECTION, POWDER, FOR SOLUTION INTRAMUSCULAR; INTRAVENOUS at 05:59

## 2018-04-06 RX ADMIN — ALBUTEROL SULFATE SCH MG: 108 AEROSOL, METERED RESPIRATORY (INHALATION) at 20:51

## 2018-04-06 RX ADMIN — ATORVASTATIN CALCIUM SCH MG: 20 TABLET, FILM COATED ORAL at 09:00

## 2018-04-06 RX ADMIN — GABAPENTIN SCH MG: 300 CAPSULE ORAL at 08:02

## 2018-04-06 RX ADMIN — IPRATROPIUM BROMIDE AND ALBUTEROL SULFATE SCH ML: .5; 3 SOLUTION RESPIRATORY (INHALATION) at 09:29

## 2018-04-06 RX ADMIN — INSULIN ASPART SCH UNITS: 100 INJECTION, SOLUTION INTRAVENOUS; SUBCUTANEOUS at 17:10

## 2018-04-06 RX ADMIN — METHYLPREDNISOLONE SODIUM SUCCINATE SCH MG: 125 INJECTION, POWDER, FOR SOLUTION INTRAMUSCULAR; INTRAVENOUS at 23:50

## 2018-04-06 RX ADMIN — LOSARTAN POTASSIUM SCH MG: 50 TABLET, FILM COATED ORAL at 08:02

## 2018-04-06 RX ADMIN — ALBUTEROL SULFATE SCH MG: 108 AEROSOL, METERED RESPIRATORY (INHALATION) at 12:00

## 2018-04-06 RX ADMIN — GABAPENTIN SCH MG: 300 CAPSULE ORAL at 20:55

## 2018-04-06 RX ADMIN — IPRATROPIUM BROMIDE AND ALBUTEROL SULFATE SCH ML: .5; 3 SOLUTION RESPIRATORY (INHALATION) at 05:59

## 2018-04-06 RX ADMIN — ALLOPURINOL SCH MG: 300 TABLET ORAL at 08:03

## 2018-04-06 RX ADMIN — AMOXICILLIN AND CLAVULANATE POTASSIUM SCH TAB: 875; 125 TABLET, FILM COATED ORAL at 20:55

## 2018-04-06 RX ADMIN — INSULIN ASPART SCH UNITS: 100 INJECTION, SOLUTION INTRAVENOUS; SUBCUTANEOUS at 12:00

## 2018-04-06 RX ADMIN — INSULIN ASPART SCH UNITS: 100 INJECTION, SOLUTION INTRAVENOUS; SUBCUTANEOUS at 17:12

## 2018-04-06 RX ADMIN — BUDESONIDE SCH MG: 0.5 SUSPENSION RESPIRATORY (INHALATION) at 09:29

## 2018-04-06 RX ADMIN — ASPIRIN 81 MG SCH MG: 81 TABLET ORAL at 08:01

## 2018-04-06 RX ADMIN — INSULIN ASPART SCH UNITS: 100 INJECTION, SOLUTION INTRAVENOUS; SUBCUTANEOUS at 08:11

## 2018-04-06 RX ADMIN — INSULIN DETEMIR SCH UNITS: 100 INJECTION, SOLUTION SUBCUTANEOUS at 08:06

## 2018-04-06 RX ADMIN — METHYLPREDNISOLONE SODIUM SUCCINATE SCH MG: 125 INJECTION, POWDER, FOR SOLUTION INTRAMUSCULAR; INTRAVENOUS at 17:52

## 2018-04-06 RX ADMIN — BUDESONIDE SCH MG: 0.5 SUSPENSION RESPIRATORY (INHALATION) at 20:52

## 2018-04-06 RX ADMIN — METHYLPREDNISOLONE SODIUM SUCCINATE SCH MG: 125 INJECTION, POWDER, FOR SOLUTION INTRAMUSCULAR; INTRAVENOUS at 11:53

## 2018-04-06 RX ADMIN — Medication SCH CAP: at 20:55

## 2018-04-07 VITALS — DIASTOLIC BLOOD PRESSURE: 66 MMHG | SYSTOLIC BLOOD PRESSURE: 139 MMHG

## 2018-04-07 VITALS — SYSTOLIC BLOOD PRESSURE: 128 MMHG | DIASTOLIC BLOOD PRESSURE: 83 MMHG

## 2018-04-07 VITALS — SYSTOLIC BLOOD PRESSURE: 145 MMHG | DIASTOLIC BLOOD PRESSURE: 72 MMHG

## 2018-04-07 VITALS — DIASTOLIC BLOOD PRESSURE: 84 MMHG | SYSTOLIC BLOOD PRESSURE: 156 MMHG

## 2018-04-07 VITALS — SYSTOLIC BLOOD PRESSURE: 140 MMHG | DIASTOLIC BLOOD PRESSURE: 68 MMHG

## 2018-04-07 LAB
% BANDS: 1 % (ref 0–9)
% LYMPHS: 13 % (ref 24–48)
% MONOS: 4 % (ref 0–10)
% SEGS: 82 % (ref 35–66)
ANION GAP SERPL CALC-SCNC: 11 MMOL/L (ref 6–14)
ANISOCYTOSIS BLD QL SMEAR: SLIGHT
BASOPHILS # BLD AUTO: 0 X10^3/UL (ref 0–0.2)
BASOPHILS NFR BLD: 0 % (ref 0–3)
CA-I SERPL ISE-MCNC: 39 MG/DL (ref 7–20)
CALCIUM SERPL-MCNC: 9.7 MG/DL (ref 8.5–10.1)
CHLORIDE SERPL-SCNC: 101 MMOL/L (ref 98–107)
CO2 SERPL-SCNC: 24 MMOL/L (ref 21–32)
CREAT SERPL-MCNC: 1.5 MG/DL (ref 0.6–1)
EOSINOPHIL NFR BLD: 0 % (ref 0–3)
EOSINOPHIL NFR BLD: 0 X10^3/UL (ref 0–0.7)
ERYTHROCYTE [DISTWIDTH] IN BLOOD BY AUTOMATED COUNT: 15 % (ref 11.5–14.5)
GFR SERPLBLD BASED ON 1.73 SQ M-ARVRAT: 43.5 ML/MIN
GLUCOSE SERPL-MCNC: 425 MG/DL (ref 70–99)
HCT VFR BLD CALC: 35.7 % (ref 36–47)
HGB BLD-MCNC: 11.6 G/DL (ref 12–15.5)
LYMPHOCYTES # BLD: 2 X10^3/UL (ref 1–4.8)
LYMPHOCYTES NFR BLD AUTO: 13 % (ref 24–48)
MCH RBC QN AUTO: 31 PG (ref 25–35)
MCHC RBC AUTO-ENTMCNC: 33 G/DL (ref 31–37)
MCV RBC AUTO: 94 FL (ref 79–100)
MONO #: 0.5 X10^3/UL (ref 0–1.1)
MONOCYTES NFR BLD: 4 % (ref 0–9)
NEUT #: 12.5 X10^3UL (ref 1.8–7.7)
NEUTROPHILS NFR BLD AUTO: 83 % (ref 31–73)
OVALOCYTES BLD QL SMEAR: (no result)
PLATELET # BLD AUTO: 257 X10^3/UL (ref 140–400)
PLATELET # BLD EST: ADEQUATE 10*3/UL
POLYCHROMASIA BLD QL SMEAR: SLIGHT
POTASSIUM SERPL-SCNC: 4.4 MMOL/L (ref 3.5–5.1)
RBC # BLD AUTO: 3.8 X10^6/UL (ref 3.5–5.4)
SODIUM SERPL-SCNC: 136 MMOL/L (ref 136–145)
TOXIC GRANULES BLD QL SMEAR: SLIGHT
WBC # BLD AUTO: 15 X10^3/UL (ref 4–11)
WBC TOXIC VACUOLES BLD QL SMEAR: SLIGHT

## 2018-04-07 RX ADMIN — GABAPENTIN SCH MG: 300 CAPSULE ORAL at 21:18

## 2018-04-07 RX ADMIN — ATORVASTATIN CALCIUM SCH MG: 20 TABLET, FILM COATED ORAL at 08:54

## 2018-04-07 RX ADMIN — INSULIN DETEMIR SCH UNITS: 100 INJECTION, SOLUTION SUBCUTANEOUS at 21:21

## 2018-04-07 RX ADMIN — ALBUTEROL SULFATE SCH MG: 108 AEROSOL, METERED RESPIRATORY (INHALATION) at 16:30

## 2018-04-07 RX ADMIN — INSULIN ASPART SCH UNITS: 100 INJECTION, SOLUTION INTRAVENOUS; SUBCUTANEOUS at 17:24

## 2018-04-07 RX ADMIN — METHYLPREDNISOLONE SODIUM SUCCINATE SCH MG: 125 INJECTION, POWDER, FOR SOLUTION INTRAMUSCULAR; INTRAVENOUS at 05:50

## 2018-04-07 RX ADMIN — Medication SCH CAP: at 21:18

## 2018-04-07 RX ADMIN — BUDESONIDE SCH MG: 0.5 SUSPENSION RESPIRATORY (INHALATION) at 11:42

## 2018-04-07 RX ADMIN — METHYLPREDNISOLONE SODIUM SUCCINATE SCH MG: 125 INJECTION, POWDER, FOR SOLUTION INTRAMUSCULAR; INTRAVENOUS at 23:52

## 2018-04-07 RX ADMIN — ALLOPURINOL SCH MG: 300 TABLET ORAL at 08:44

## 2018-04-07 RX ADMIN — INSULIN ASPART SCH UNITS: 100 INJECTION, SOLUTION INTRAVENOUS; SUBCUTANEOUS at 11:42

## 2018-04-07 RX ADMIN — METHYLPREDNISOLONE SODIUM SUCCINATE SCH MG: 125 INJECTION, POWDER, FOR SOLUTION INTRAMUSCULAR; INTRAVENOUS at 11:49

## 2018-04-07 RX ADMIN — ALBUTEROL SULFATE SCH MG: 108 AEROSOL, METERED RESPIRATORY (INHALATION) at 21:01

## 2018-04-07 RX ADMIN — TRIAMTERENE AND HYDROCHLOROTHIAZIDE SCH TAB: 37.5; 25 TABLET ORAL at 08:44

## 2018-04-07 RX ADMIN — ALBUTEROL SULFATE SCH MG: 108 AEROSOL, METERED RESPIRATORY (INHALATION) at 11:42

## 2018-04-07 RX ADMIN — LOSARTAN POTASSIUM SCH MG: 50 TABLET, FILM COATED ORAL at 08:44

## 2018-04-07 RX ADMIN — GABAPENTIN SCH MG: 300 CAPSULE ORAL at 08:43

## 2018-04-07 RX ADMIN — ALBUTEROL SULFATE SCH MG: 108 AEROSOL, METERED RESPIRATORY (INHALATION) at 05:19

## 2018-04-07 RX ADMIN — BUDESONIDE SCH MG: 0.5 SUSPENSION RESPIRATORY (INHALATION) at 21:01

## 2018-04-07 RX ADMIN — INSULIN ASPART SCH UNITS: 100 INJECTION, SOLUTION INTRAVENOUS; SUBCUTANEOUS at 17:29

## 2018-04-07 RX ADMIN — Medication SCH CAP: at 08:43

## 2018-04-07 RX ADMIN — INSULIN ASPART SCH UNITS: 100 INJECTION, SOLUTION INTRAVENOUS; SUBCUTANEOUS at 11:41

## 2018-04-07 RX ADMIN — DOCUSATE SODIUM PRN MG: 100 CAPSULE, LIQUID FILLED ORAL at 21:33

## 2018-04-07 RX ADMIN — INSULIN ASPART SCH UNITS: 100 INJECTION, SOLUTION INTRAVENOUS; SUBCUTANEOUS at 08:39

## 2018-04-07 RX ADMIN — ASPIRIN 81 MG SCH MG: 81 TABLET ORAL at 08:43

## 2018-04-07 RX ADMIN — METHYLPREDNISOLONE SODIUM SUCCINATE SCH MG: 125 INJECTION, POWDER, FOR SOLUTION INTRAMUSCULAR; INTRAVENOUS at 18:06

## 2018-04-07 RX ADMIN — AMOXICILLIN AND CLAVULANATE POTASSIUM SCH TAB: 875; 125 TABLET, FILM COATED ORAL at 21:18

## 2018-04-07 RX ADMIN — MONTELUKAST SODIUM SCH MG: 10 TABLET ORAL at 21:18

## 2018-04-07 RX ADMIN — INSULIN DETEMIR SCH UNITS: 100 INJECTION, SOLUTION SUBCUTANEOUS at 08:52

## 2018-04-07 RX ADMIN — AMOXICILLIN AND CLAVULANATE POTASSIUM SCH TAB: 875; 125 TABLET, FILM COATED ORAL at 08:43

## 2018-04-07 RX ADMIN — DOCUSATE SODIUM PRN MG: 100 CAPSULE, LIQUID FILLED ORAL at 08:44

## 2018-04-07 NOTE — PN
DATE:  04/06/2018



SUBJECTIVE:  The patient is resting slightly propped up in bed, in no apparent

distress.  She continued to complain of cough, chest tightness there.  She is

also expectorating yellowish phlegm.  Denied any chest pain, denied any chills,

rigors or fever.  Her blood sugar obviously is not well controlled, although I

increased her both NovoLog and Lantus insulin yesterday.



PHYSICAL EXAMINATION:

GENERAL:  When I examined her today, she looked well and was clearly in no

apparent respiratory distress, slightly pale.  No jaundice, cyanosis, or

thyromegaly.  No jugular venous distension.  No limb edema.

VITAL SIGNS:  Her heart rate was 96, blood pressure was 129/77, temperature was

97.9, respiratory rate 20, and oxygen saturation was 97% on room air.

HEAD, EYES, EARS, NOSE AND THROAT:  Showed normocephalic, atraumatic.

NECK:  Supple.

HEART:  Showed normal first and second heart sounds with no gallop, rub or

murmur.

CHEST:  Clear to auscultation.  No crepitation or rhonchi.  She has more

wheezing on the right side than the left.  The patient is not using her

accessory muscles.  She is not tripod position.  She is able to finish this is

without difficulty.

ABDOMEN:  Distended, soft, nontender.  No guarding or rigidity.  No

organomegaly.  All hernial orifice intact.  Bowel sounds normal.

NEUROLOGIC:  She is awake, alert, responding appropriately.  All cranial nerves

intact.  She moves extremities without difficulty.  She ambulates without

assistance or assistive devices.



Her intake was 3700, and no output was recorded.



LABORATORY DATA:  This morning showed her white cell count of 8400, hemoglobin

12, hematocrit 37, MCV 95 and platelet count 263,000.  Her chemistry showed a

serum sodium 137, potassium 4.5, chloride 101, bicarbonate 26, anion gap of 10,

BUN 28, creatinine 1.3.  Estimated GFR was 51 mL per minute.  Her glucose was

416, calcium was 9.9.



ASSESSMENT:

1. Acute severe asthma.

2. Type 2 diabetes mellitus, that is poorly controlled.

3. Hypertension.

4. Hyperlipidemia.

5. Gout.

6. Zenker diverticulum.



PLAN:  My plan is to continue with IV Solu-Medrol.  Continue with

bronchodilator.  I will add antibiotics.  She might be aspirating from her

Zenker diverticulum.  I will start her on Augmentin 875 mg twice a day with

food.  I will repeat her chest x-ray and evaluate her again tomorrow.  I

discontinued her glipizide and added metformin that was gradual be increased and

also adjust her NovoLog and Lantus insulin further.





______________________________

DWAINE CAMPBELL MD



DR:  JORDAN/rita  JOB#:  [id number] / [id number]

DD:  04/06/2018 15:52  DT:  04/07/2018 00:16

## 2018-04-07 NOTE — RAD
2 view CXR:



Clinical indications: Worsening of shortness of air.



Comparison: April 5, 2018



Findings: No acute lung infiltrate or pleural effusion or pulmonary edema or

lung mass or pneumothorax is seen.  The heart size, pulmonary vasculature,

mediastinum and both destiny are unremarkable. The osseous structures appear

intact.



Impression: No acute radiographic abnormality is seen.

## 2018-04-08 VITALS — DIASTOLIC BLOOD PRESSURE: 79 MMHG | SYSTOLIC BLOOD PRESSURE: 146 MMHG

## 2018-04-08 VITALS
DIASTOLIC BLOOD PRESSURE: 74 MMHG | SYSTOLIC BLOOD PRESSURE: 154 MMHG | DIASTOLIC BLOOD PRESSURE: 74 MMHG | SYSTOLIC BLOOD PRESSURE: 154 MMHG

## 2018-04-08 LAB
ANION GAP SERPL CALC-SCNC: 10 MMOL/L (ref 6–14)
CA-I SERPL ISE-MCNC: 47 MG/DL (ref 7–20)
CALCIUM SERPL-MCNC: 9.7 MG/DL (ref 8.5–10.1)
CHLORIDE SERPL-SCNC: 100 MMOL/L (ref 98–107)
CO2 SERPL-SCNC: 26 MMOL/L (ref 21–32)
CREAT SERPL-MCNC: 1.4 MG/DL (ref 0.6–1)
GFR SERPLBLD BASED ON 1.73 SQ M-ARVRAT: 47.1 ML/MIN
GLUCOSE SERPL-MCNC: 416 MG/DL (ref 70–99)
POTASSIUM SERPL-SCNC: 4.4 MMOL/L (ref 3.5–5.1)
SODIUM SERPL-SCNC: 136 MMOL/L (ref 136–145)

## 2018-04-08 RX ADMIN — ALLOPURINOL SCH MG: 300 TABLET ORAL at 09:18

## 2018-04-08 RX ADMIN — BUDESONIDE SCH MG: 0.5 SUSPENSION RESPIRATORY (INHALATION) at 11:29

## 2018-04-08 RX ADMIN — GABAPENTIN SCH MG: 300 CAPSULE ORAL at 09:18

## 2018-04-08 RX ADMIN — INSULIN ASPART SCH UNITS: 100 INJECTION, SOLUTION INTRAVENOUS; SUBCUTANEOUS at 08:23

## 2018-04-08 RX ADMIN — METHYLPREDNISOLONE SODIUM SUCCINATE SCH MG: 125 INJECTION, POWDER, FOR SOLUTION INTRAMUSCULAR; INTRAVENOUS at 12:14

## 2018-04-08 RX ADMIN — ALBUTEROL SULFATE SCH MG: 108 AEROSOL, METERED RESPIRATORY (INHALATION) at 11:29

## 2018-04-08 RX ADMIN — AMOXICILLIN AND CLAVULANATE POTASSIUM SCH TAB: 875; 125 TABLET, FILM COATED ORAL at 09:18

## 2018-04-08 RX ADMIN — Medication SCH CAP: at 09:18

## 2018-04-08 RX ADMIN — METHYLPREDNISOLONE SODIUM SUCCINATE SCH MG: 125 INJECTION, POWDER, FOR SOLUTION INTRAMUSCULAR; INTRAVENOUS at 05:53

## 2018-04-08 RX ADMIN — ASPIRIN 81 MG SCH MG: 81 TABLET ORAL at 09:18

## 2018-04-08 RX ADMIN — DOCUSATE SODIUM PRN MG: 100 CAPSULE, LIQUID FILLED ORAL at 09:18

## 2018-04-08 RX ADMIN — INSULIN ASPART SCH UNITS: 100 INJECTION, SOLUTION INTRAVENOUS; SUBCUTANEOUS at 12:19

## 2018-04-08 RX ADMIN — ALBUTEROL SULFATE SCH MG: 108 AEROSOL, METERED RESPIRATORY (INHALATION) at 05:32

## 2018-04-08 RX ADMIN — INSULIN ASPART SCH UNITS: 100 INJECTION, SOLUTION INTRAVENOUS; SUBCUTANEOUS at 12:20

## 2018-04-08 RX ADMIN — INSULIN DETEMIR SCH UNITS: 100 INJECTION, SOLUTION SUBCUTANEOUS at 09:28

## 2018-04-08 NOTE — DS
DATE OF DISCHARGE:  04/08/2018



HISTORY OF PRESENT ILLNESS:  This is a 57-year-old  female

patient who was admitted to the Emergency Room to [facility] with a

complaint of shortness of breath, chest tightness, has been going on for 4-5

days.  She stated that she has been having coughing and sneezing.  She went to

her primary care physician, was treated with amoxicillin, Tessalon Perles and

cough medication without much improvement.  She does have history of chronic

asthma for which she has 2 inhalers.  She has been using them occasionally and

has not had any acute asthma exacerbation for a long time.  The patient denied

any fever; however, despite all her medication, her condition has worsened and

was admitted with acute asthma exacerbation.  Her lab work initially showed a

normal white cell count.  X-ray showed no pneumothorax and no pleural effusion

or infiltrate.  We did start her on Solu-Medrol, nebulized treatment.  I added

actually Augmentin as she has not really been improving, and despite all the

treatment, her kidney function also started to deteriorate and creatinine is

going up from a baseline of 1.1 up to 1.5.  Given that she continued to have

marked chest tightness, wheezing with deterioration of kidney function, I am

concerned about some form of pulmonary-renal syndrome like Churg-Janina

syndrome or Goodpasture syndrome or Wegener granulomatosis, and therefore, a

decision was made to transfer her to [facility] ICU to consult

the pulmonologist as well as the nephrologist.



PHYSICAL EXAMINATION:

GENERAL:  When I saw her this afternoon, she was sitting propped up in bed, in

no apparent distress.  She was not using her accessory muscles.  Noted that she

used a tripod position; however, she clearly continued to have marked diffuse

chest tightness and wheezing.

VITAL SIGNS:  Her heart rate was 78, blood pressure 146/79, temperature was

97.6, respiratory rate 20, and oxygen saturation was 93% on room air.

HEAD, EYES, EARS, NOSE AND THROAT:  Showed normocephalic, atraumatic.

NECK:  Supple.

HEART:  Showed normal first and second heart sounds.  No gallop, rub or murmur.

CHEST:  Clear to auscultation.  No crepitation or rhonchi.

ABDOMEN:  Distended, soft, nontender.

NEUROLOGIC:  She is awake, alert, responding appropriately.  All cranial nerves

intact.  She moves extremities without difficulty.  She ambulates without

assistance or assistive devices.



Her intake was 2300, no output was recorded.



DIAGNOSTIC STUDIES:  Her lab work as of this morning showed a serum sodium 136,

potassium 4.4, chloride 100, bicarbonate 26, anion gap of 10, BUN 47, creatinine

1.4, estimated GFR was 47, blood glucose was 416 and calcium was 9.4.  Her white

cell count was 15,000, hemoglobin 11, hematocrit 36, MCV 94 and platelet count

of 257,000.  Her chest x-ray showed that there are no lung infiltrate or pleural

effusion, no pulmonary edema or lung mass or pneumothorax seen.  The heart size,

pulmonary vasculature, mediastinum and both destiny are unremarkable.  The osseous

structures appear intact.



DISCHARGE MEDICATIONS:  The patient will be transferred to [facility] ICU to continue with lactobacillus rhamnosus 1 capsule twice a day,

Augmentin 875 mg/125 twice a day, Tylenol 650 mg once a day.  She is on Levemir

insulin 65 units twice a day and NovoLog insulin 45 units 3 times a day before

meals, albuterol sulfate 2.5 mg every 2 hours, Singulair 10 mg at bedtime,

Pulmicort 0.5 mg twice a day, insulin aspart 45 units 3 times a day, famotidine

20 mg twice a day, cetirizine 10 mg once a day, gabapentin 600 mg 3 times a day,

aspirin 81 mg once a day, allopurinol 300 mg twice a day, methylprednisolone 60

mg IV q. 6 hourly, Colace 100 mg twice a day.



ASSESSMENT:

1. Acute severe asthma.  Continue patient with marked chest tightness and

wheezing.

2. Acute kidney injury with a question of possible pulmonary-renal syndrome.  I

did discontinue her losartan and Dyazide.

3. Other medical problems include poorly controlled type 2 diabetes,

hypertension, hyperlipidemia, coronary artery disease, and she has a history of

gout and Zenker diverticulum.





______________________________

DWAINE CAMPBELL MD



DR:  JORDAN/irta  JOB#:  [id number] / [id number]

DD:  04/08/2018 12:25  DT:  04/08/2018 20:10

## 2018-04-08 NOTE — PN
DATE:  04/07/2018



SUBJECTIVE:  The patient is resting slightly propped up in bed, in no apparent

distress.  She continued to complain of cough, shortness of breath, chest

tightness and wheezing, has been up, but continued to feel short winded,

continued to have cough with yellowish sputum.



PHYSICAL EXAMINATION:

GENERAL:  When I examined her today, she looked slightly pale, but no jaundice,

cyanosis, or thyromegaly.  No jugular venous distension.  No limb edema.

VITAL SIGNS:  Her heart rate was 82, blood pressure 145/72, temperature was

97.9, respiratory rate 20, and oxygen saturation was 97% on room air.

HEAD, EYES, EARS, NOSE AND THROAT:  Showed normocephalic, atraumatic.

NECK:  Supple.

HEART:  Showed normal first and second heart sounds with no gallop, rub or

murmur.

CHEST:  Clear to auscultation.  No crepitation or rhonchi.

ABDOMEN:  Distended, soft, nontender.  No guarding or rigidity.  No

organomegaly.  Hernial orifice intact.  Bowel sounds normal.

NEUROLOGIC:  She is awake, alert, responding appropriately.  All cranial nerves

intact.  She moves extremities without difficulty.  She ambulates without

assistance or assistive devices.



Her intake over the last 24 hours was 1500, no output was recorded.



LABORATORY DATA:  White cell count 15,000, hemoglobin 11, hematocrit 35, MCV 94

and platelet count of 257,000.  Her chemistry showed a serum sodium 136,

potassium 4.4, chloride 101, bicarbonate 24, anion gap of 11, BUN 39, creatinine

was 1.5, glucose was 425.



ASSESSMENT:

1.  Acute severe asthma.

2.  Acute kidney injury.  Creatinine rising from 1.1-1.5.



Other medical problems include hypertension, poorly controlled type 2 diabetes,

hyperlipidemia, gout, and Zenker diverticulum.



PLAN:  My plan is to discontinue losartan, hydrochlorothiazide and triamterene. 

I would also hold the metformin.  Repeat her lab works tomorrow and decide

further management accordingly.





______________________________

DWAINE CAMPBELL MD



DR:  JORDAN/rita  JOB#:  [id number] / [id number]

DD:  04/07/2018 13:18  DT:  04/08/2018 09:14

## 2018-05-17 ENCOUNTER — HOSPITAL ENCOUNTER (OUTPATIENT)
Dept: HOSPITAL 63 - SURG | Age: 58
End: 2018-05-17
Attending: ANESTHESIOLOGY
Payer: MEDICARE

## 2018-05-17 DIAGNOSIS — M47.816: Primary | ICD-10-CM

## 2018-05-17 DIAGNOSIS — J45.909: ICD-10-CM

## 2018-05-17 DIAGNOSIS — Z87.39: ICD-10-CM

## 2018-05-17 DIAGNOSIS — Z88.8: ICD-10-CM

## 2018-05-17 DIAGNOSIS — I10: ICD-10-CM

## 2018-05-17 DIAGNOSIS — Z88.1: ICD-10-CM

## 2018-05-17 DIAGNOSIS — E11.9: ICD-10-CM

## 2018-05-17 PROCEDURE — 64494 INJ PARAVERT F JNT L/S 2 LEV: CPT

## 2018-05-17 PROCEDURE — 64493 INJ PARAVERT F JNT L/S 1 LEV: CPT

## 2018-05-17 PROCEDURE — 99152 MOD SED SAME PHYS/QHP 5/>YRS: CPT
